# Patient Record
Sex: MALE | Employment: UNEMPLOYED | URBAN - METROPOLITAN AREA
[De-identification: names, ages, dates, MRNs, and addresses within clinical notes are randomized per-mention and may not be internally consistent; named-entity substitution may affect disease eponyms.]

---

## 2017-09-19 ENCOUNTER — OFFICE VISIT (OUTPATIENT)
Dept: INTERNAL MEDICINE CLINIC | Facility: CLINIC | Age: 47
End: 2017-09-19

## 2017-09-19 ENCOUNTER — HOSPITAL ENCOUNTER (OUTPATIENT)
Dept: ULTRASOUND IMAGING | Facility: HOSPITAL | Age: 47
Discharge: HOME | End: 2017-09-19
Attending: INTERNAL MEDICINE
Payer: COMMERCIAL

## 2017-09-19 VITALS
HEART RATE: 99 BPM | HEIGHT: 68.5 IN | SYSTOLIC BLOOD PRESSURE: 164 MMHG | BODY MASS INDEX: 35.58 KG/M2 | WEIGHT: 237.5 LBS | DIASTOLIC BLOOD PRESSURE: 76 MMHG

## 2017-09-19 DIAGNOSIS — R60.0 LEG EDEMA, LEFT: ICD-10-CM

## 2017-09-19 DIAGNOSIS — I10 ESSENTIAL HYPERTENSION: ICD-10-CM

## 2017-09-19 DIAGNOSIS — R60.0 LEG EDEMA, LEFT: Primary | ICD-10-CM

## 2017-09-19 PROCEDURE — 99212 OFFICE O/P EST SF 10 MIN: CPT | Performed by: INTERNAL MEDICINE

## 2017-09-19 PROCEDURE — 99203 OFFICE O/P NEW LOW 30 MIN: CPT | Performed by: INTERNAL MEDICINE

## 2017-09-19 PROCEDURE — 93971 EXTREMITY STUDY: CPT | Performed by: INTERNAL MEDICINE

## 2017-09-19 NOTE — PROGRESS NOTES
Corbin Morris is a 49year old male who presents this morning for evaluation. HPI:   For approximately 1-1/2 weeks, he has noticed progressive swelling of his left lower leg with some associated pain.   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

## 2018-03-06 ENCOUNTER — ANESTHESIA (OUTPATIENT)
Dept: SURGERY | Facility: HOSPITAL | Age: 48
End: 2018-03-06
Payer: COMMERCIAL

## 2018-03-06 ENCOUNTER — ANESTHESIA EVENT (OUTPATIENT)
Dept: SURGERY | Facility: HOSPITAL | Age: 48
End: 2018-03-06
Payer: COMMERCIAL

## 2018-03-06 ENCOUNTER — HOSPITAL ENCOUNTER (OUTPATIENT)
Facility: HOSPITAL | Age: 48
Setting detail: HOSPITAL OUTPATIENT SURGERY
Discharge: HOME | End: 2018-03-06
Attending: ORTHOPAEDIC SURGERY | Admitting: ORTHOPAEDIC SURGERY
Payer: COMMERCIAL

## 2018-03-06 ENCOUNTER — SURGERY (OUTPATIENT)
Age: 48
End: 2018-03-06

## 2018-03-06 VITALS
WEIGHT: 232 LBS | HEIGHT: 68 IN | OXYGEN SATURATION: 95 % | SYSTOLIC BLOOD PRESSURE: 127 MMHG | BODY MASS INDEX: 35.16 KG/M2 | HEART RATE: 110 BPM | TEMPERATURE: 97 F | RESPIRATION RATE: 16 BRPM | DIASTOLIC BLOOD PRESSURE: 86 MMHG

## 2018-03-06 DIAGNOSIS — M75.102 LEFT ROTATOR CUFF TEAR: Primary | ICD-10-CM

## 2018-03-06 DIAGNOSIS — M75.42 ROTATOR CUFF IMPINGEMENT SYNDROME, LEFT: ICD-10-CM

## 2018-03-06 DIAGNOSIS — M19.012 DEGENERATIVE ARTHRITIS OF LEFT SHOULDER REGION: ICD-10-CM

## 2018-03-06 PROCEDURE — 3E0T3BZ INTRODUCTION OF ANESTHETIC AGENT INTO PERIPHERAL NERVES AND PLEXI, PERCUTANEOUS APPROACH: ICD-10-PCS | Performed by: ANESTHESIOLOGY

## 2018-03-06 PROCEDURE — 99152 MOD SED SAME PHYS/QHP 5/>YRS: CPT | Performed by: ORTHOPAEDIC SURGERY

## 2018-03-06 PROCEDURE — 76942 ECHO GUIDE FOR BIOPSY: CPT | Performed by: ORTHOPAEDIC SURGERY

## 2018-03-06 PROCEDURE — 0RBK4ZZ EXCISION OF LEFT SHOULDER JOINT, PERCUTANEOUS ENDOSCOPIC APPROACH: ICD-10-PCS | Performed by: ORTHOPAEDIC SURGERY

## 2018-03-06 PROCEDURE — 64415 NJX AA&/STRD BRCH PLXS IMG: CPT | Performed by: ORTHOPAEDIC SURGERY

## 2018-03-06 PROCEDURE — 0RNK4ZZ RELEASE LEFT SHOULDER JOINT, PERCUTANEOUS ENDOSCOPIC APPROACH: ICD-10-PCS | Performed by: ORTHOPAEDIC SURGERY

## 2018-03-06 RX ORDER — NALOXONE HYDROCHLORIDE 0.4 MG/ML
80 INJECTION, SOLUTION INTRAMUSCULAR; INTRAVENOUS; SUBCUTANEOUS AS NEEDED
Status: DISCONTINUED | OUTPATIENT
Start: 2018-03-06 | End: 2018-03-06

## 2018-03-06 RX ORDER — HYDROCODONE BITARTRATE AND ACETAMINOPHEN 5; 325 MG/1; MG/1
2 TABLET ORAL AS NEEDED
Status: DISCONTINUED | OUTPATIENT
Start: 2018-03-06 | End: 2018-03-06

## 2018-03-06 RX ORDER — SODIUM CHLORIDE, SODIUM LACTATE, POTASSIUM CHLORIDE, CALCIUM CHLORIDE 600; 310; 30; 20 MG/100ML; MG/100ML; MG/100ML; MG/100ML
INJECTION, SOLUTION INTRAVENOUS CONTINUOUS
Status: DISCONTINUED | OUTPATIENT
Start: 2018-03-06 | End: 2018-03-06

## 2018-03-06 RX ORDER — LIDOCAINE HYDROCHLORIDE 10 MG/ML
INJECTION, SOLUTION EPIDURAL; INFILTRATION; INTRACAUDAL; PERINEURAL AS NEEDED
Status: DISCONTINUED | OUTPATIENT
Start: 2018-03-06 | End: 2018-03-06 | Stop reason: SURG

## 2018-03-06 RX ORDER — HYDROCODONE BITARTRATE AND ACETAMINOPHEN 5; 325 MG/1; MG/1
1 TABLET ORAL AS NEEDED
Status: DISCONTINUED | OUTPATIENT
Start: 2018-03-06 | End: 2018-03-06

## 2018-03-06 RX ORDER — DEXAMETHASONE SODIUM PHOSPHATE 10 MG/ML
INJECTION, SOLUTION INTRAMUSCULAR; INTRAVENOUS AS NEEDED
Status: DISCONTINUED | OUTPATIENT
Start: 2018-03-06 | End: 2018-03-06 | Stop reason: SURG

## 2018-03-06 RX ORDER — MORPHINE SULFATE 2 MG/ML
2 INJECTION, SOLUTION INTRAMUSCULAR; INTRAVENOUS EVERY 10 MIN PRN
Status: DISCONTINUED | OUTPATIENT
Start: 2018-03-06 | End: 2018-03-06

## 2018-03-06 RX ORDER — MIDAZOLAM HYDROCHLORIDE 1 MG/ML
INJECTION INTRAMUSCULAR; INTRAVENOUS AS NEEDED
Status: DISCONTINUED | OUTPATIENT
Start: 2018-03-06 | End: 2018-03-06 | Stop reason: SURG

## 2018-03-06 RX ORDER — FAMOTIDINE 20 MG/1
20 TABLET ORAL ONCE
Status: COMPLETED | OUTPATIENT
Start: 2018-03-06 | End: 2018-03-06

## 2018-03-06 RX ORDER — ROCURONIUM BROMIDE 10 MG/ML
INJECTION, SOLUTION INTRAVENOUS AS NEEDED
Status: DISCONTINUED | OUTPATIENT
Start: 2018-03-06 | End: 2018-03-06 | Stop reason: SURG

## 2018-03-06 RX ORDER — METOCLOPRAMIDE 10 MG/1
10 TABLET ORAL ONCE
Status: COMPLETED | OUTPATIENT
Start: 2018-03-06 | End: 2018-03-06

## 2018-03-06 RX ORDER — ONDANSETRON 2 MG/ML
4 INJECTION INTRAMUSCULAR; INTRAVENOUS ONCE AS NEEDED
Status: DISCONTINUED | OUTPATIENT
Start: 2018-03-06 | End: 2018-03-06

## 2018-03-06 RX ORDER — HALOPERIDOL 5 MG/ML
0.25 INJECTION INTRAMUSCULAR ONCE AS NEEDED
Status: DISCONTINUED | OUTPATIENT
Start: 2018-03-06 | End: 2018-03-06

## 2018-03-06 RX ORDER — ONDANSETRON 2 MG/ML
INJECTION INTRAMUSCULAR; INTRAVENOUS AS NEEDED
Status: DISCONTINUED | OUTPATIENT
Start: 2018-03-06 | End: 2018-03-06 | Stop reason: SURG

## 2018-03-06 RX ORDER — MORPHINE SULFATE 4 MG/ML
4 INJECTION, SOLUTION INTRAMUSCULAR; INTRAVENOUS EVERY 10 MIN PRN
Status: DISCONTINUED | OUTPATIENT
Start: 2018-03-06 | End: 2018-03-06

## 2018-03-06 RX ORDER — DEXAMETHASONE SODIUM PHOSPHATE 4 MG/ML
VIAL (ML) INJECTION AS NEEDED
Status: DISCONTINUED | OUTPATIENT
Start: 2018-03-06 | End: 2018-03-06 | Stop reason: SURG

## 2018-03-06 RX ORDER — ROPIVACAINE HYDROCHLORIDE 5 MG/ML
INJECTION, SOLUTION EPIDURAL; INFILTRATION; PERINEURAL AS NEEDED
Status: DISCONTINUED | OUTPATIENT
Start: 2018-03-06 | End: 2018-03-06 | Stop reason: SURG

## 2018-03-06 RX ORDER — ACETAMINOPHEN 500 MG
1000 TABLET ORAL ONCE
Status: COMPLETED | OUTPATIENT
Start: 2018-03-06 | End: 2018-03-06

## 2018-03-06 RX ORDER — NEOSTIGMINE METHYLSULFATE 0.5 MG/ML
INJECTION INTRAVENOUS AS NEEDED
Status: DISCONTINUED | OUTPATIENT
Start: 2018-03-06 | End: 2018-03-06 | Stop reason: SURG

## 2018-03-06 RX ORDER — LABETALOL HYDROCHLORIDE 5 MG/ML
10 INJECTION, SOLUTION INTRAVENOUS ONCE
Status: COMPLETED | OUTPATIENT
Start: 2018-03-06 | End: 2018-03-06

## 2018-03-06 RX ORDER — MORPHINE SULFATE 10 MG/ML
6 INJECTION, SOLUTION INTRAMUSCULAR; INTRAVENOUS EVERY 10 MIN PRN
Status: DISCONTINUED | OUTPATIENT
Start: 2018-03-06 | End: 2018-03-06

## 2018-03-06 RX ORDER — GLYCOPYRROLATE 0.2 MG/ML
INJECTION INTRAMUSCULAR; INTRAVENOUS AS NEEDED
Status: DISCONTINUED | OUTPATIENT
Start: 2018-03-06 | End: 2018-03-06 | Stop reason: SURG

## 2018-03-06 RX ADMIN — GLYCOPYRROLATE 0.2 MG: 0.2 INJECTION INTRAMUSCULAR; INTRAVENOUS at 15:05:00

## 2018-03-06 RX ADMIN — MIDAZOLAM HYDROCHLORIDE 2 MG: 1 INJECTION INTRAMUSCULAR; INTRAVENOUS at 14:00:00

## 2018-03-06 RX ADMIN — ROPIVACAINE HYDROCHLORIDE 30 ML: 5 INJECTION, SOLUTION EPIDURAL; INFILTRATION; PERINEURAL at 14:07:00

## 2018-03-06 RX ADMIN — DEXAMETHASONE SODIUM PHOSPHATE 4 MG: 4 MG/ML VIAL (ML) INJECTION at 14:29:00

## 2018-03-06 RX ADMIN — LIDOCAINE HYDROCHLORIDE 5 ML: 10 INJECTION, SOLUTION EPIDURAL; INFILTRATION; INTRACAUDAL; PERINEURAL at 14:03:00

## 2018-03-06 RX ADMIN — ONDANSETRON 4 MG: 2 INJECTION INTRAMUSCULAR; INTRAVENOUS at 14:29:00

## 2018-03-06 RX ADMIN — SODIUM CHLORIDE, SODIUM LACTATE, POTASSIUM CHLORIDE, CALCIUM CHLORIDE: 600; 310; 30; 20 INJECTION, SOLUTION INTRAVENOUS at 14:29:00

## 2018-03-06 RX ADMIN — NEOSTIGMINE METHYLSULFATE 3 MG: 0.5 INJECTION INTRAVENOUS at 15:05:00

## 2018-03-06 RX ADMIN — ROCURONIUM BROMIDE 10 MG: 10 INJECTION, SOLUTION INTRAVENOUS at 14:29:00

## 2018-03-06 RX ADMIN — SODIUM CHLORIDE, SODIUM LACTATE, POTASSIUM CHLORIDE, CALCIUM CHLORIDE: 600; 310; 30; 20 INJECTION, SOLUTION INTRAVENOUS at 15:26:00

## 2018-03-06 RX ADMIN — DEXAMETHASONE SODIUM PHOSPHATE 4 MG: 10 INJECTION, SOLUTION INTRAMUSCULAR; INTRAVENOUS at 14:07:00

## 2018-03-06 RX ADMIN — LIDOCAINE HYDROCHLORIDE 5 ML: 10 INJECTION, SOLUTION EPIDURAL; INFILTRATION; INTRACAUDAL; PERINEURAL at 14:29:00

## 2018-03-06 NOTE — ANESTHESIA POSTPROCEDURE EVALUATION
Patient: Yang Solis    Procedure Summary     Date:  03/06/18 Room / Location:  [address] [place] [address] / [address] [place] [address] MAIN OR    Anesthesia Start:  0654 Anesthesia Stop:  4782    Procedure:  SHOULDER ARTHROSCOPY ROTATOR CUFF REPAIR (Left Shoulder) Diagnosis:  (left lisseth

## 2018-03-06 NOTE — ANESTHESIA PREPROCEDURE EVALUATION
Anesthesia PreOp Note    HPI:     Nel Parr is a 49year old male who presents for preoperative consultation requested by: Rajinder Chavira MD    Date of Surgery: 3/6/2018    Procedure(s):  SHOULDER ARTHROSCOPY ROTATOR CUFF REPAIR  Indication: (36.8 °C). His blood pressure is 145/89 and his pulse is 107. His respiration is 18 and oxygen saturation is 98%.     03/02/18  1244 03/06/18  1245   BP:  145/89   BP Location:  Right arm   Pulse:  107   Resp:  18   Temp:  98.3 °F (36.8 °C)   TempSrc:  Oral

## 2018-03-06 NOTE — H&P
CC Left shoulder pain  HPI Left shoulder pain with activity  PMH HTN  Allergies Amoxicillin  ROS negative  PE  Positive impingement  RTC tear left   Rtc repair left

## 2018-03-06 NOTE — ANESTHESIA PROCEDURE NOTES
Peripheral Block    Anesthesiologist:  Lennie Alicea  Performed by:   Anesthesiologist  Patient Location:  PACU  Start Time:  3/6/2018 1:57 PM  End Time:  3/6/2018 2:07 PM  Site Identification: ultrasound guided, real time ultrasound guided, nerve stimulato

## 2018-03-07 NOTE — OPERATIVE REPORT
[facility]    PATIENT'S NAME: HELEN MATHIS   ATTENDING PHYSICIAN: Mauricio Schaffer MD   OPERATING PHYSICIAN: Mauricio Schaffer MD   PATIENT ACCOUNT#:   [id number]    LOCATION:  [facility] 1 [facility] 10  MEDICAL RECORD #:   [id number] All the bursal tissue was resected. Final pictures were obtained, and he was taken to the recovery room in excellent condition. Dictated By Vanessa De La Vega.  Fred Pagan MD  d: 03/06/2018 15:50:54  t: 03/07/2018 32:37:62  Sergo Gonzalez [id number]  [facility] [place]/

## 2019-08-21 ENCOUNTER — OFFICE VISIT (OUTPATIENT)
Dept: DERMATOLOGY CLINIC | Facility: CLINIC | Age: 49
End: 2019-08-21
Payer: COMMERCIAL

## 2019-08-21 DIAGNOSIS — D23.60 BENIGN NEOPLASM OF SKIN OF UPPER LIMB, INCLUDING SHOULDER, UNSPECIFIED LATERALITY: ICD-10-CM

## 2019-08-21 DIAGNOSIS — D23.4 BENIGN NEOPLASM OF SCALP AND SKIN OF NECK: ICD-10-CM

## 2019-08-21 DIAGNOSIS — D23.30 BENIGN NEOPLASM OF SKIN OF FACE: ICD-10-CM

## 2019-08-21 DIAGNOSIS — D23.5 BENIGN NEOPLASM OF SKIN OF TRUNK, EXCEPT SCROTUM: ICD-10-CM

## 2019-08-21 DIAGNOSIS — D22.9 MULTIPLE NEVI: ICD-10-CM

## 2019-08-21 DIAGNOSIS — L57.0 AK (ACTINIC KERATOSIS): Primary | ICD-10-CM

## 2019-08-21 PROCEDURE — 99203 OFFICE O/P NEW LOW 30 MIN: CPT | Performed by: DERMATOLOGY

## 2019-08-22 ENCOUNTER — TELEPHONE (OUTPATIENT)
Dept: DERMATOLOGY CLINIC | Facility: CLINIC | Age: 49
End: 2019-08-22

## 2019-08-22 RX ORDER — FLUOROURACIL 50 MG/G
CREAM TOPICAL
Qty: 40 G | Refills: 1 | Status: SHIPPED | OUTPATIENT
Start: 2019-08-22

## 2019-08-22 RX ORDER — FLUOROURACIL 50 MG/G
CREAM TOPICAL
Qty: 40 G | Refills: 1 | Status: SHIPPED | OUTPATIENT
Start: 2019-08-22 | End: 2019-11-19

## 2019-10-30 ENCOUNTER — OFFICE VISIT (OUTPATIENT)
Dept: DERMATOLOGY CLINIC | Facility: CLINIC | Age: 49
End: 2019-10-30
Payer: COMMERCIAL

## 2019-10-30 DIAGNOSIS — D23.5 BENIGN NEOPLASM OF SKIN OF TRUNK, EXCEPT SCROTUM: ICD-10-CM

## 2019-10-30 DIAGNOSIS — D22.9 MULTIPLE NEVI: ICD-10-CM

## 2019-10-30 DIAGNOSIS — D23.60 BENIGN NEOPLASM OF SKIN OF UPPER LIMB, INCLUDING SHOULDER, UNSPECIFIED LATERALITY: ICD-10-CM

## 2019-10-30 DIAGNOSIS — D23.30 BENIGN NEOPLASM OF SKIN OF FACE: ICD-10-CM

## 2019-10-30 DIAGNOSIS — L57.0 AK (ACTINIC KERATOSIS): Primary | ICD-10-CM

## 2019-10-30 DIAGNOSIS — D23.4 BENIGN NEOPLASM OF SCALP AND SKIN OF NECK: ICD-10-CM

## 2019-10-30 PROCEDURE — 99213 OFFICE O/P EST LOW 20 MIN: CPT | Performed by: DERMATOLOGY

## 2019-11-11 NOTE — PROGRESS NOTES
John Morrell is a 47year old male. HPI:     CC:  Patient presents with:  Lesion: LOV 8/21/2019 Patient present to f/u on AK face . [facility] Patient currently using fluororacil with some imrovement         Allergies:  Amoxicillin    HISTORY:    Past Medical His file        Inability: Not on file      Transportation needs:        Medical: Not on file        Non-medical: Not on file    Tobacco Use      Smoking status: Former Smoker        Packs/day: 2.00        Years: 15.00        Pack years: 27        Quit date: 5 cancer as noted. No personal history of skin cancer reported. Patient presents with concerns above. Patient has been in their usual state of health. History, medications, allergies reviewed as noted. ROS:  Denies any other systemic complaints. the temples forehead cheeks and nasal tip. Consider repeat fluorouracil after the holidays. Discussed possible maintenance and application as needed to any new areas. Atrophic patch 4 x 5 mm at nasal tip. No significant scale.   Not bleeding or cruste

## 2019-11-19 RX ORDER — FLUOROURACIL 50 MG/G
CREAM TOPICAL
Qty: 40 G | Refills: 0 | Status: SHIPPED | OUTPATIENT
Start: 2019-11-19

## 2019-11-21 ENCOUNTER — OFFICE VISIT (OUTPATIENT)
Dept: OPHTHALMOLOGY | Facility: CLINIC | Age: 49
End: 2019-11-21
Payer: COMMERCIAL

## 2019-11-21 DIAGNOSIS — H40.003 GLAUCOMA SUSPECT OF BOTH EYES: Primary | ICD-10-CM

## 2019-11-21 PROCEDURE — 92004 COMPRE OPH EXAM NEW PT 1/>: CPT | Performed by: OPHTHALMOLOGY

## 2019-11-21 PROCEDURE — 92250 FUNDUS PHOTOGRAPHY W/I&R: CPT | Performed by: OPHTHALMOLOGY

## 2019-11-21 NOTE — PROGRESS NOTES
Meera Gibson is a 47year old male.     HPI:     HPI     Pt is here for a complete exam. Pt went to [facility] on 11/18/19 for glasses and was told he should see an ophthalmologist because of high IOP and suspicious looking optic nerves OU (pos [address]    Right PERRL    Left PERRL          Extraocular Movement       Right Left     Full, Ortho Full, Ortho          Dilation     Both eyes:  1.0% Mydriacyl and 2.5% Lee Synephrine @ 2:27 PM            Slit Lamp and Fundus Exam

## 2019-11-21 NOTE — PATIENT INSTRUCTIONS
Glaucoma suspect of both eyes  Discussed with patient that he is a glaucoma suspect based on increased cupping of the optic nerves in both eyes. Retinal photos taken today to document optic nerves. Glaucoma diagnostic testing ordered.   Will not start me

## 2019-12-18 ENCOUNTER — NURSE ONLY (OUTPATIENT)
Dept: OPHTHALMOLOGY | Facility: CLINIC | Age: 49
End: 2019-12-18
Payer: COMMERCIAL

## 2019-12-18 ENCOUNTER — TELEPHONE (OUTPATIENT)
Dept: OPHTHALMOLOGY | Facility: CLINIC | Age: 49
End: 2019-12-18

## 2019-12-18 DIAGNOSIS — H40.003 GLAUCOMA SUSPECT OF BOTH EYES: ICD-10-CM

## 2019-12-18 PROCEDURE — 76514 ECHO EXAM OF EYE THICKNESS: CPT | Performed by: OPHTHALMOLOGY

## 2019-12-18 PROCEDURE — 92133 CPTRZD OPH DX IMG PST SGM ON: CPT | Performed by: OPHTHALMOLOGY

## 2019-12-18 PROCEDURE — 92083 EXTENDED VISUAL FIELD XM: CPT | Performed by: OPHTHALMOLOGY

## 2019-12-18 NOTE — PROGRESS NOTES
Christina Kennedy is a 47year old male.     HPI:     HPI     Patient is here for a VF, OCT and pachymetry with no MD.      Last edited by Prateek Weir OT on 12/18/2019  8:04 AM. (History)        Patient History:  Past Medical History:   Diagnosis Date   •

## 2021-09-29 ENCOUNTER — OFFICE VISIT (OUTPATIENT)
Dept: DERMATOLOGY CLINIC | Facility: CLINIC | Age: 51
End: 2021-09-29
Payer: COMMERCIAL

## 2021-09-29 DIAGNOSIS — L57.0 AK (ACTINIC KERATOSIS): Primary | ICD-10-CM

## 2021-09-29 DIAGNOSIS — L81.4 LENTIGO: ICD-10-CM

## 2021-09-29 DIAGNOSIS — D23.30 BENIGN NEOPLASM OF SKIN OF FACE: ICD-10-CM

## 2021-09-29 DIAGNOSIS — D22.9 MULTIPLE NEVI: ICD-10-CM

## 2021-09-29 DIAGNOSIS — D23.4 BENIGN NEOPLASM OF SCALP AND SKIN OF NECK: ICD-10-CM

## 2021-09-29 PROCEDURE — 99213 OFFICE O/P EST LOW 20 MIN: CPT | Performed by: DERMATOLOGY

## 2021-09-29 PROCEDURE — 17003 DESTRUCT PREMALG LES 2-14: CPT | Performed by: DERMATOLOGY

## 2021-09-29 PROCEDURE — 17000 DESTRUCT PREMALG LESION: CPT | Performed by: DERMATOLOGY

## 2021-09-29 RX ORDER — FLUOROURACIL 50 MG/G
CREAM TOPICAL
Qty: 40 G | Refills: 1 | Status: SHIPPED | OUTPATIENT
Start: 2021-09-29

## 2021-10-10 NOTE — PROGRESS NOTES
Rosa Velez is a 45year old male.   HPI:     CC:  Patient presents with:  Derm Problem: LOV 10/30/2019- pt presents for f/u on rash on hands, pt denies any growth,  Hx of Aks        Allergies:  Amoxicillin    HISTORY:    Past Medical History:   Diagnos 1999        Years since quittin.0      Smokeless tobacco: Never Used    Substance and Sexual Activity      Alcohol use: Yes        Comment: 1-2 beers 4-5 days weekly      Drug use: No      Sexual activity: Not on file    Other Topics      Concern • Glaucoma Neg        There were no vitals filed for this visit. HPI:    Patient presents with:  Derm Problem: LOV 10/30/2019- pt presents for f/u on rash on hands, pt denies any growth,  Hx of Aks    History of actinic keratoses.   Follow-up multiple 1     Sig: Use twice weekly at night as directed x 6 weeks         Ak (actinic keratosis)  (primary encounter diagnosis)  Multiple nevi  Benign neoplasm of scalp and skin of neck  Benign neoplasm of skin of face  Lentigo    See details on map.       Chandler Gutierrez Instructions reviewed at length. Benign nevi, seborrheic  keratoses, cherry angiomas:  Reassurance regarding other benign skin lesions. Signs and symptoms of skin cancer, ABCDE's of melanoma discussed with patient.  Sunscreen use, sun protection, self exa

## 2023-06-17 ENCOUNTER — OFFICE VISIT (OUTPATIENT)
Dept: DERMATOLOGY CLINIC | Facility: CLINIC | Age: 53
End: 2023-06-17

## 2023-06-17 DIAGNOSIS — L82.1 SK (SEBORRHEIC KERATOSIS): ICD-10-CM

## 2023-06-17 DIAGNOSIS — D23.5 BENIGN NEOPLASM OF SKIN OF TRUNK: ICD-10-CM

## 2023-06-17 DIAGNOSIS — D23.60 BENIGN NEOPLASM OF SKIN OF UPPER LIMB, INCLUDING SHOULDER, UNSPECIFIED LATERALITY: ICD-10-CM

## 2023-06-17 DIAGNOSIS — D22.9 MULTIPLE NEVI: ICD-10-CM

## 2023-06-17 DIAGNOSIS — D23.30 BENIGN NEOPLASM OF SKIN OF FACE: ICD-10-CM

## 2023-06-17 DIAGNOSIS — D23.4 BENIGN NEOPLASM OF SCALP AND SKIN OF NECK: ICD-10-CM

## 2023-06-17 DIAGNOSIS — L57.0 AK (ACTINIC KERATOSIS): Primary | ICD-10-CM

## 2023-06-17 DIAGNOSIS — L81.4 LENTIGO: ICD-10-CM

## 2023-06-17 PROCEDURE — 99214 OFFICE O/P EST MOD 30 MIN: CPT | Performed by: DERMATOLOGY

## 2024-06-10 NOTE — PROGRESS NOTES
Nel Parr is a 47year old male. HPI:     CC:  Patient presents with:  Upper Body Exam: LOV 6/6/2012 with Dr. Lucy Ball.  Pt presenting for upper body skin exam. Pt has a personal hx of AKs, pt also has a family hx of unknown type of skin cancer (fath Not on file      Food insecurity:        Worry: Not on file        Inability: Not on file      Transportation needs:        Medical: Not on file        Non-medical: Not on file    Tobacco Use      Smoking status: Former Smoker        Packs/day: 2.00 of skin cancer as noted. No personal history of skin cancer reported. Patient presents with concerns above. Patient has been in their usual state of health. History, medications, allergies reviewed as noted.       ROS:  Denies any other systemic compl Remarkable for:    Actinic keratoses diffuse erythematous scaling plaques over the anterior scalp temples preauricular cheeks brows zygomatic area medial cheeks nasal dorsum helices. Actinic keratoses. Precancerous nature discussed.   Treatment optio [facility]

## 2025-01-13 ENCOUNTER — OFFICE VISIT (OUTPATIENT)
Dept: DERMATOLOGY CLINIC | Facility: CLINIC | Age: 55
End: 2025-01-13
Payer: COMMERCIAL

## 2025-01-13 DIAGNOSIS — Z51.81 MEDICATION MONITORING ENCOUNTER: ICD-10-CM

## 2025-01-13 DIAGNOSIS — D22.9 MULTIPLE NEVI: ICD-10-CM

## 2025-01-13 DIAGNOSIS — D23.9 BENIGN NEOPLASM OF SKIN, UNSPECIFIED LOCATION: ICD-10-CM

## 2025-01-13 DIAGNOSIS — L57.0 AK (ACTINIC KERATOSIS): Primary | ICD-10-CM

## 2025-01-13 DIAGNOSIS — L81.4 LENTIGO: ICD-10-CM

## 2025-01-13 DIAGNOSIS — L82.1 SK (SEBORRHEIC KERATOSIS): ICD-10-CM

## 2025-01-13 PROCEDURE — 99214 OFFICE O/P EST MOD 30 MIN: CPT | Performed by: DERMATOLOGY

## 2025-01-13 RX ORDER — FLUOROURACIL 50 MG/G
CREAM TOPICAL
Qty: 40 G | Refills: 1 | Status: SHIPPED | OUTPATIENT
Start: 2025-01-13

## 2025-01-13 NOTE — PROGRESS NOTES
The following individual(s) verbally consented to be recorded using ambient AI listening technology and understand that they can each withdraw their consent to this listening technology at any point by asking the clinician to turn off or pause the recording: Omid Guerrero

## 2025-01-19 NOTE — PROGRESS NOTES
Omid Guerrero is a 54 year old male.    CC:    Chief Complaint   Patient presents with    Full Skin Exam     Last office visit: 23  Type of check: Upper body  Lesions of concern: scalp, face, and arms  Symptoms for lesions: Crusty   Personal hx of skin cancer: Actinic Keratosis   Family hx of Skin cancer: Unknown (Father)   Sun Screen use: Yes (Sometimes)       Refill Request     Refill Request for medication(s): fluorouracil 5 % External Cream  Last Refill: 21  Pharmacy, Dosage verified: [id number] - [place], [place] - [address] [phone], [phone]  Rx pended and sent to provider for approval, please advise. Thank You!         HISTORY:    Past Medical History:    Essential hypertension      History reviewed. No pertinent surgical history.   Family History   Problem Relation Age of Onset    Diabetes Father     Skin cancer Father         unknown type    Heart Disorder Mother     Other (brain tumor) Mother         benign    Macular degeneration Neg     Glaucoma Neg       Social History     Socioeconomic History    Marital status:    Occupational History    Occupation:    Tobacco Use    Smoking status: Former     Current packs/day: 0.00     Average packs/day: 2.0 packs/day for 15.0 years (30.0 ttl pk-yrs)     Types: Cigarettes     Start date: 1984     Quit date: 1999     Years since quittin.3    Smokeless tobacco: Never   Substance and Sexual Activity    Alcohol use: Yes     Comment: 1-2 beers 4-5 days weekly    Drug use: No   Other Topics Concern    History of tanning Yes    Outdoor occupation Yes    Reaction to local anesthetic No    Pt has a pacemaker No    Pt has a defibrillator No        Current Outpatient Medications   Medication Sig Dispense Refill    fluorouracil 5 % External Cream Use twice weekly at night as directed x 6 weeks off x 4 weeks and repeat course to forehead, temples, ears, nose and hands 40 g 1    FLUOROURACIL 5 % External Cream USE  TWICE WEEKLY AT NIGHT AS DIRECTED X 6 WEEKS (Patient not taking: Reported on 2025) 40 g 0    fluorouracil 5 % External Cream Use twice weekly at night as directed x 6 weeks (Patient not taking: Reported on 2025) 40 g 1     Allergies:   Allergies[1]    Past Medical History:    Essential hypertension     History reviewed. No pertinent surgical history.  Social History     Socioeconomic History    Marital status:      Spouse name: Not on file    Number of children: Not on file    Years of education: Not on file    Highest education level: Not on file   Occupational History    Occupation:    Tobacco Use    Smoking status: Former     Current packs/day: 0.00     Average packs/day: 2.0 packs/day for 15.0 years (30.0 ttl pk-yrs)     Types: Cigarettes     Start date: 1984     Quit date: 1999     Years since quittin.3    Smokeless tobacco: Never   Substance and Sexual Activity    Alcohol use: Yes     Comment: 1-2 beers 4-5 days weekly    Drug use: No    Sexual activity: Not on file   Other Topics Concern    Grew up on a farm Not Asked    History of tanning Yes    Outdoor occupation Yes    Reaction to local anesthetic No    Pt has a pacemaker No    Pt has a defibrillator No   Social History Narrative    Not on file     Social Drivers of Health     Financial Resource Strain: Not on file   Food Insecurity: Not on file   Transportation Needs: Not on file   Physical Activity: Not on file   Stress: Not on file   Social Connections: Not on file   Housing Stability: Not on file     Family History   Problem Relation Age of Onset    Diabetes Father     Skin cancer Father         unknown type    Heart Disorder Mother     Other (brain tumor) Mother         benign    Macular degeneration Neg     Glaucoma Neg        HPI:     HPI:  Chief Complaint   Patient presents with    Full Skin Exam     Last office visit: 23  Type of check: Upper body  Lesions of concern: scalp, face, and arms  Symptoms  for lesions: Crusty   Personal hx of skin cancer: Actinic Keratosis   Family hx of Skin cancer: Unknown (Father)   Sun Screen use: Yes (Sometimes)       Refill Request     Refill Request for medication(s): fluorouracil 5 % External Cream  Last Refill: 09/29/21  Pharmacy, Dosage verified: [id number] - [place], [place] - [address] [phone], [phone]  Rx pended and sent to provider for approval, please advise. Thank You!       History of Present Illness  The patient, with a history of pre-cancerous skin conditions, has been using fluorouracil intermittently on the scalp and temples. He reports that the medication's use is more frequent during the summer months due to increased visibility of skin changes, despite the challenges of outdoor work. He also notes that the skin conditions seem to improve during the winter months.    The patient has also been applying the medication on the left side of the nose, where a significant skin change was noted during the summer. He has been manually removing the skin change, which he acknowledges may not be the best approach.    In addition to the face, the patient has been using the medication on the left hand, where he has noticed crusty skin changes. He reports that the medication has significantly improved the skin condition on the hand, reducing the thickness of the keratoses.    The patient has also been applying the medication on the ears and occasionally on the arms. He reports that the skin behind the ears and on the neck, which had numerous spots, has significantly improved with the use of the medication.    The patient acknowledges that he may have overused the medication initially, leading to skin peeling and noticeable skin changes. He is now considering a more structured approach to the medication's use, with a six-week course followed by a break and then another course. He is aware of the potential systemic effects of the medication if used over large  areas and is considering alternating the application between the face and the hands.    No personal  or family history of skin cancer    Patient presents with concerns above.    Patient has been in their usual state of health.     Past notes/ records and appropriate/relevant lab results including pathology and past body maps reviewed. Including outside notes/ PCP notes as appropriate. Updated and new information noted in current visit.     ROS:  Denies other relevant systemic complaints. See HPI.     History, medications, allergies reviewed as noted.    Allergies:  Amoxicillin      There were no vitals filed for this visit.    Physical Examination:     Well-developed well-nourished patient alert oriented in no acute distress.  Exam performed of appropriate involved areas    Physical Exam  SKIN: Scalp with benign keratosis and precancerous lesions. Forehead and temple treated with fluorouracil. Left side of the nose with a small patch. Left hand shows improvement with treatment, no tenderness upon palpation. Right cheek near the cheekbone and under the eye with redness. Back with freckles, no significant sun damage observed.    Multiple light to medium brown, well marginated, uniformly pigmented, macules and papules 6 mm and less scattered on exam. pigmented lesions examined with dermoscopy benign-appearing patterns.     Waxy tannish keratotic papules scattered, cherry-red vascular papules scattered.    See map today's date for lesions noted .  See assessment and plan below for specific lesions.    Otherwise remarkable for lesions as noted on map.    See A/P  below for additional information:    Assessment / plan:    Results      No orders of the defined types were placed in this encounter.      Meds & Refills for this Visit:  Requested Prescriptions     Signed Prescriptions Disp Refills    fluorouracil 5 % External Cream 40 g 1     Sig: Use twice weekly at night as directed x 6 weeks off x 4 weeks and repeat course  to forehead, temples, ears, nose and hands         Encounter Diagnoses   Name Primary?    AK (actinic keratosis) Yes    Multiple nevi     Lentigo     SK (seborrheic keratosis)     Benign neoplasm of skin, unspecified location     Medication monitoring encounter        Assessment & Plan  Actinic Keratosis  Actinic keratosis on the crown, forehead, temples, left side of the nose, and hands, exacerbated by sun exposure, especially in summer. Intermittent use of fluorouracil has shown improvement. Emphasized consistent treatment to prevent progression to squamous cell carcinoma and discussed risks of systemic absorption and potential side effects (fatigue, headache, joint pain, lowered blood counts). Advised alternating application between face and hands to minimize systemic absorption. Goal: control condition to reduce patients that flare over the summer- Apply fluorouracil twice a week for six weeks  - Take a one-month break after initial course  - Repeat treatment once a week if needed  - Alternate application between face and hands  - Treat back of left hand, forehead, temples, and nose  - Monitor for systemic side effects    Actinic keratoses.  Precancerous nature discussed.  Treatment options reviewed at length we will proceed with topical therapy with Efudex twice weekly for   6   week course  of actual medication use and may alternate weeks if necessary.  Increased redness scaling crusting irritation pain tenderness potential discussed.  Anticipate one month for resolution of symptoms.  Plan recheck in one month after completion of treatment course.  Consider alternative treatment biopsy if not resolved.    Long-term maintenance therapy with this advised as well    Seborrheic Keratosis  Benign keratosis noted with actinic keratosis. No signs of malignancy. Reassured about benign nature.  - Monitor for changes in lesions    General Health Maintenance  Discussed sun protection to prevent further damage. Advised  use of hats and sunscreen during outdoor activities.  - Use hats and sunscreen during outdoor activities  - Monitor for new or changing lesions    Follow-up  - Schedule follow-up after initial six-week treatment course.  History of lichenoid keratosis biopsied in the past, history of excision of cyst mid back no recurrence  Benign-appearing nevi, no atypical features on dermoscopy reassurance given monitor.     Waxy tan keratotic papules lesions in areas of concern as noted reassurance given.  Benign nature discussed.  Possibility of cryo, alphahydroxy acids over-the-counter retinol's discussed.     No other susupicious lesions on todays  exam.    Please refer to map for specific lesions.  See additional diagnoses.  Pros cons of various therapies, risks benefits discussed.Pathophysiology, terapeutic options reviewed.  See  Medications in grid.  Instructions reviewed at length.    Benign nevi, seborrheic  keratoses, cherry angiomas:  Reassurance regarding other benign skin lesions.    Monitor for new or changing lesions. Signs and symptoms of skin cancer, ABCDE's of melanoma ( additional information available at AAD.org, skincancer.org) Encourage Sunscreen (broad-spectrum, ideally mineral-based-UVA/UVB -SPF 30 or higher) use encouraged, sun protection/sun protective clothing, self exams reviewed Followup as noted RTC ---routine checkup 6 mos -one year or p.r.n.    Encounter Times   Including precharting, reviewing chart, prior notes obtaining history: 10 minutes, medical exam :10 minutes, notes on body map, plan, counseling 10minutes My total time spent caring for the patient on the day of the encounter: 30 minutes     The patient indicates understanding of these issues and agrees to the plan.  The patient is asked to return as noted in follow-up/ above.    This note was generated using Dragon voice recognition software.  Please contact me regarding any confusion resulting from errors in recognition..  Note to  patient and family: The 21st Century Cures Act makes medical notes like these available to patients. However, be advised this is a medical document. It is intended as [id number] communication and monitoring of a patient's care needs. It is written in medical language and may contain abbreviations or verbiage that are unfamiliar. It may appear blunt or direct. Medical documents are intended to carry relevant information, facts as evident and the clinical opinion of the practitioner.       [1]   Allergies  Allergen Reactions    Amoxicillin HIVES     Patient believes he has had penicillin since

## (undated) DEVICE — SHOULDER ARTHROSCOPY: Brand: MEDLINE INDUSTRIES, INC.

## (undated) DEVICE — TUBING IRR 16FT CNT WV 3 ASCP

## (undated) DEVICE — CANNULA 5.75 CLEAR AR-6560

## (undated) DEVICE — SLEEVE CMPR VLCR STRL

## (undated) DEVICE — STERILE LATEX POWDER-FREE SURGICAL GLOVESWITH NITRILE COATING: Brand: PROTEXIS

## (undated) DEVICE — BURR SHVR COOLCUT 13CM 4MM 8

## (undated) DEVICE — SUTURE ETHILON 4-0 662G

## (undated) DEVICE — SPECIALTY ARM SLING: Brand: DEROYAL

## (undated) DEVICE — ABDOMINAL PAD: Brand: CURITY

## (undated) DEVICE — 3M™ MICROFOAM™ TAPE 1528-4: Brand: 3M™ MICROFOAM™

## (undated) DEVICE — AMBIENT SUPER TURBOVAC 90 IFS: Brand: COBLATION

## (undated) DEVICE — BLADE SHVR 13CM 4MM EXCLBR

## (undated) DEVICE — SOL  .9 3000ML

## (undated) DEVICE — EXPRESSEW III SUTURE NEEDLE FOR USE WITH EXPRESSEW II OR III SUTURE PASSER: Brand: EXPRESSEW

## (undated) NOTE — LETTER
12/18/2019            [address] [address]       Dear Rafia Kong,    The visual field test you took on 12/18/2019 indicated normal field of vision in both eyes.   An optic nerve analysis showed normal retinal nerve